# Patient Record
Sex: MALE | Race: WHITE | NOT HISPANIC OR LATINO | Employment: FULL TIME | URBAN - METROPOLITAN AREA
[De-identification: names, ages, dates, MRNs, and addresses within clinical notes are randomized per-mention and may not be internally consistent; named-entity substitution may affect disease eponyms.]

---

## 2023-08-07 ENCOUNTER — HOSPITAL ENCOUNTER (EMERGENCY)
Facility: CLINIC | Age: 28
Discharge: HOME | End: 2023-08-07
Attending: EMERGENCY MEDICINE | Admitting: EMERGENCY MEDICINE
Payer: COMMERCIAL

## 2023-08-07 ENCOUNTER — APPOINTMENT (OUTPATIENT)
Dept: CT IMAGING | Facility: CLINIC | Age: 28
End: 2023-08-07
Attending: EMERGENCY MEDICINE
Payer: COMMERCIAL

## 2023-08-07 VITALS
SYSTOLIC BLOOD PRESSURE: 157 MMHG | RESPIRATION RATE: 18 BRPM | DIASTOLIC BLOOD PRESSURE: 96 MMHG | OXYGEN SATURATION: 97 % | HEART RATE: 115 BPM | TEMPERATURE: 98 F

## 2023-08-07 DIAGNOSIS — R10.11 RIGHT UPPER QUADRANT ABDOMINAL PAIN: ICD-10-CM

## 2023-08-07 LAB
ALBUMIN SERPL BCG-MCNC: 5 G/DL (ref 3.5–5.2)
ALBUMIN UR-MCNC: 50 MG/DL
ALP SERPL-CCNC: 68 U/L (ref 40–129)
ALT SERPL W P-5'-P-CCNC: 81 U/L (ref 0–70)
ANION GAP SERPL CALCULATED.3IONS-SCNC: 15 MMOL/L (ref 7–15)
APPEARANCE UR: CLEAR
AST SERPL W P-5'-P-CCNC: 78 U/L (ref 0–45)
BASOPHILS # BLD AUTO: 0.1 10E3/UL (ref 0–0.2)
BASOPHILS NFR BLD AUTO: 1 %
BILIRUB SERPL-MCNC: 0.7 MG/DL
BILIRUB UR QL STRIP: NEGATIVE
BUN SERPL-MCNC: 8.1 MG/DL (ref 6–20)
CALCIUM SERPL-MCNC: 9.3 MG/DL (ref 8.6–10)
CHLORIDE SERPL-SCNC: 100 MMOL/L (ref 98–107)
COLOR UR AUTO: YELLOW
CREAT SERPL-MCNC: 1.09 MG/DL (ref 0.67–1.17)
DEPRECATED HCO3 PLAS-SCNC: 25 MMOL/L (ref 22–29)
EOSINOPHIL # BLD AUTO: 0.2 10E3/UL (ref 0–0.7)
EOSINOPHIL NFR BLD AUTO: 2 %
ERYTHROCYTE [DISTWIDTH] IN BLOOD BY AUTOMATED COUNT: 13 % (ref 10–15)
GFR SERPL CREATININE-BSD FRML MDRD: >90 ML/MIN/1.73M2
GLUCOSE SERPL-MCNC: 94 MG/DL (ref 70–99)
GLUCOSE UR STRIP-MCNC: NEGATIVE MG/DL
HCT VFR BLD AUTO: 44.3 % (ref 40–53)
HGB BLD-MCNC: 15 G/DL (ref 13.3–17.7)
HGB UR QL STRIP: NEGATIVE
HYALINE CASTS: 1 /LPF
IMM GRANULOCYTES # BLD: 0 10E3/UL
IMM GRANULOCYTES NFR BLD: 0 %
KETONES UR STRIP-MCNC: NEGATIVE MG/DL
LEUKOCYTE ESTERASE UR QL STRIP: NEGATIVE
LIPASE SERPL-CCNC: 13 U/L (ref 13–60)
LYMPHOCYTES # BLD AUTO: 1.2 10E3/UL (ref 0.8–5.3)
LYMPHOCYTES NFR BLD AUTO: 14 %
MCH RBC QN AUTO: 29 PG (ref 26.5–33)
MCHC RBC AUTO-ENTMCNC: 33.9 G/DL (ref 31.5–36.5)
MCV RBC AUTO: 86 FL (ref 78–100)
MONOCYTES # BLD AUTO: 0.5 10E3/UL (ref 0–1.3)
MONOCYTES NFR BLD AUTO: 6 %
MUCOUS THREADS #/AREA URNS LPF: PRESENT /LPF
NEUTROPHILS # BLD AUTO: 6.4 10E3/UL (ref 1.6–8.3)
NEUTROPHILS NFR BLD AUTO: 77 %
NITRATE UR QL: NEGATIVE
NRBC # BLD AUTO: 0 10E3/UL
NRBC BLD AUTO-RTO: 0 /100
PH UR STRIP: 7 [PH] (ref 5–7)
PLATELET # BLD AUTO: 229 10E3/UL (ref 150–450)
POTASSIUM SERPL-SCNC: 3.8 MMOL/L (ref 3.4–5.3)
PROT SERPL-MCNC: 7.4 G/DL (ref 6.4–8.3)
RBC # BLD AUTO: 5.17 10E6/UL (ref 4.4–5.9)
RBC URINE: <1 /HPF
SODIUM SERPL-SCNC: 140 MMOL/L (ref 136–145)
SP GR UR STRIP: 1.03 (ref 1–1.03)
SQUAMOUS EPITHELIAL: <1 /HPF
UROBILINOGEN UR STRIP-MCNC: NORMAL MG/DL
WBC # BLD AUTO: 8.4 10E3/UL (ref 4–11)
WBC URINE: <1 /HPF

## 2023-08-07 PROCEDURE — 99285 EMERGENCY DEPT VISIT HI MDM: CPT | Mod: 25

## 2023-08-07 PROCEDURE — 85025 COMPLETE CBC W/AUTO DIFF WBC: CPT | Performed by: EMERGENCY MEDICINE

## 2023-08-07 PROCEDURE — 82310 ASSAY OF CALCIUM: CPT | Performed by: EMERGENCY MEDICINE

## 2023-08-07 PROCEDURE — 36415 COLL VENOUS BLD VENIPUNCTURE: CPT | Performed by: EMERGENCY MEDICINE

## 2023-08-07 PROCEDURE — 83690 ASSAY OF LIPASE: CPT | Performed by: EMERGENCY MEDICINE

## 2023-08-07 PROCEDURE — 96374 THER/PROPH/DIAG INJ IV PUSH: CPT | Mod: 59

## 2023-08-07 PROCEDURE — 81001 URINALYSIS AUTO W/SCOPE: CPT | Performed by: EMERGENCY MEDICINE

## 2023-08-07 PROCEDURE — 96361 HYDRATE IV INFUSION ADD-ON: CPT

## 2023-08-07 PROCEDURE — 74177 CT ABD & PELVIS W/CONTRAST: CPT

## 2023-08-07 PROCEDURE — 250N000011 HC RX IP 250 OP 636: Performed by: EMERGENCY MEDICINE

## 2023-08-07 PROCEDURE — 258N000003 HC RX IP 258 OP 636: Performed by: EMERGENCY MEDICINE

## 2023-08-07 RX ORDER — IOPAMIDOL 755 MG/ML
500 INJECTION, SOLUTION INTRAVASCULAR ONCE
Status: COMPLETED | OUTPATIENT
Start: 2023-08-07 | End: 2023-08-07

## 2023-08-07 RX ORDER — LORAZEPAM 2 MG/ML
0.5 INJECTION INTRAMUSCULAR ONCE
Status: COMPLETED | OUTPATIENT
Start: 2023-08-07 | End: 2023-08-07

## 2023-08-07 RX ADMIN — SODIUM CHLORIDE 1000 ML: 9 INJECTION, SOLUTION INTRAVENOUS at 11:05

## 2023-08-07 RX ADMIN — LORAZEPAM 0.5 MG: 2 INJECTION INTRAMUSCULAR; INTRAVENOUS at 11:06

## 2023-08-07 RX ADMIN — SODIUM CHLORIDE 100 ML: 9 INJECTION, SOLUTION INTRAVENOUS at 11:28

## 2023-08-07 RX ADMIN — IOPAMIDOL 75 ML: 755 INJECTION, SOLUTION INTRAVENOUS at 11:28

## 2023-08-07 ASSESSMENT — ACTIVITIES OF DAILY LIVING (ADL)
ADLS_ACUITY_SCORE: 35
ADLS_ACUITY_SCORE: 35

## 2023-08-07 NOTE — ED TRIAGE NOTES
A&O x4, ABCs intact. Pt presents with hematemesis that started today. Pt states that he has left sided abdominal pain as well that he was seen at urgent care for this last week.

## 2023-08-07 NOTE — LETTER
August 7, 2023      To Whom It May Concern:      Stan Cruz was seen in our Emergency Department today, 08/07/23.  I expect his condition to improve over the next 2 days.  He may return to work/school when improved.    Sincerely,        Rosamaria JACKSON

## 2023-08-07 NOTE — LETTER
08/07/23      To Whom it may concern:    Susanna Cruz was in our Emergency Department today, 08/07/23. with a patient who needed their assistance.  Please excuse them from work/school.      Sincerely,      Rosamaria JACKSON

## 2023-08-07 NOTE — ED PROVIDER NOTES
History     Chief Complaint:  Hematemesis    The history is provided by the patient and a parent.      Stan Cruz is a 28 year old male who presents to the ED with mom for evaluation of hematemesis. Stan endorses left upper quadrant abdominal pain and emesis began last week. Hematemesis began this morning with large softball-sized clots, prompting his ED visit today. He notes 10-15 episodes of emesis already today. He has only passed one bowel movement in the last week, with assistance from medications, and he is not passing gas. He endorses recently feeling hot without measured temperature, cough, rhinitis, and decreased urinary frequency. He received Zofran but it did not alleviate symptoms. No pharyngitis or dysuria.    Stan is also concerned that his health struggles are exacerbating his anxiety and mental health concerns. He endorses thoughts to harm himself. No thoughts to harm others. He has not attempted to hurt himself and does not have a plan. He is receiving medical and mental help today voluntarily. He notes occasional alcohol and marijuana use.    Independent Historian:   Mother - They report that Stan has been hospitalized 5 times for bowel obstructions and bowel ruptures. These problems began after he took a knee to his abdomen in freshman year of high school, prompting emergency surgery to remove 6 ft of intestines.    Review of External Notes:   Reviewed Urgent Care visit from 8/3/23 for SBO and small bowel rupture. I reviewed his abdominal XR from 8/3/23.    Medications:    Pepcid  Wellbutrin  Lexapro    Past Medical History:    JESIKA  SBO  Small bowel rupture    Physical Exam   Patient Vitals for the past 24 hrs:   BP Temp Temp src Pulse Resp SpO2   08/07/23 1430 (!) 157/96 -- -- 115 -- --   08/07/23 0958 (!) 157/105 98  F (36.7  C) Oral 92 18 97 %      Physical Exam  General: Resting on the bed.  Head: No obvious trauma to head.  Ears, Nose, Throat:  External ears normal.  Nose normal.    Eyes:   Conjunctivae clear.    CV: Regular rate and rhythm.  No murmurs.      Respiratory: Effort normal and breath sounds normal.  No wheezing or crackles.   Gastrointestinal: Soft.  No distension. There is mild epigastric and RUQ tenderness.  There is no rigidity, no rebound and no guarding.   Neuro: Alert. Moving all extremities appropriately.  Normal speech.    Skin: Skin is warm and dry.  No rash noted.   Psych: Normal mood and affect. Behavior is normal.  Reports some passive SI but no active SI at this time.  Denies any plan.  Denies any prior attempts.  Insight appears intact.    Emergency Department Course     Imaging:  CT Abdomen Pelvis w Contrast   Final Result   IMPRESSION:       1. No evidence of a bowel obstruction.       2. Right upper quadrant small bowel anastomosis with probable adjacent   collapsed small bowel loops versus very short segment intussusception,   which would be of doubtful clinical significance.      3. Mild hepatic steatosis.      LINDY WHITESIDE MD            SYSTEM ID:  [id number]         Report per radiology    Laboratory:  Labs Ordered and Resulted from Time of ED Arrival to Time of ED Departure   COMPREHENSIVE METABOLIC PANEL - Abnormal       Result Value    Sodium 140      Potassium 3.8      Chloride 100      Carbon Dioxide (CO2) 25      Anion Gap 15      Urea Nitrogen 8.1      Creatinine 1.09      Calcium 9.3      Glucose 94      Alkaline Phosphatase 68      AST 78 (*)     ALT 81 (*)     Protein Total 7.4      Albumin 5.0      Bilirubin Total 0.7      GFR Estimate >90     ROUTINE UA WITH MICROSCOPIC REFLEX TO CULTURE - Abnormal    Color Urine Yellow      Appearance Urine Clear      Glucose Urine Negative      Bilirubin Urine Negative      Ketones Urine Negative      Specific Gravity Urine 1.027      Blood Urine Negative      pH Urine 7.0      Protein Albumin Urine 50 (*)     Urobilinogen Urine Normal      Nitrite Urine Negative      Leukocyte Esterase Urine Negative      Mucus  Urine Present (*)     RBC Urine <1      WBC Urine <1      Squamous Epithelials Urine <1      Hyaline Casts Urine 1     LIPASE - Normal    Lipase 13     CBC WITH PLATELETS AND DIFFERENTIAL    WBC Count 8.4      RBC Count 5.17      Hemoglobin 15.0      Hematocrit 44.3      MCV 86      MCH 29.0      MCHC 33.9      RDW 13.0      Platelet Count 229      % Neutrophils 77      % Lymphocytes 14      % Monocytes 6      % Eosinophils 2      % Basophils 1      % Immature Granulocytes 0      NRBCs per 100 WBC 0      Absolute Neutrophils 6.4      Absolute Lymphocytes 1.2      Absolute Monocytes 0.5      Absolute Eosinophils 0.2      Absolute Basophils 0.1      Absolute Immature Granulocytes 0.0      Absolute NRBCs 0.0       Emergency Department Course & Assessments:    PSS-3      Date and Time Over the past 2 weeks have you felt down, depressed, or hopeless? Over the past 2 weeks have you had thoughts of killing yourself? Have you ever attempted to kill yourself? When did this last happen? User   08/07/23 0957 yes yes no -- TE          C-SSRS (Melbeta)      Date and Time Q1 Wished to be Dead (Past Month) Q2 Suicidal Thoughts (Past Month) Q3 Suicidal Thought Method Q4 Suicidal Intent without Specific Plan Q5 Suicide Intent with Specific Plan Q6 Suicide Behavior (Lifetime) Within the Past 3 Months? RETIRED: Level of Risk per Screen Screening Not Complete User   08/07/23 0957 yes yes no no no no -- -- -- TE                Item Assessment   Suicidal Ideation Suicidal thoughts   Plan none   Intent none   Suicidal or self-harm behaviors none   Risk Factors Previous psychiatric diagnosis and treatments   Protective Factors Identified reasons for living and Supportive social network of family and/or friends       Interventions:  Medications   0.9% sodium chloride BOLUS (1,000 mLs Intravenous $New Bag 8/7/23 1105)   LORazepam (ATIVAN) injection 0.5 mg (0.5 mg Intravenous $Given 8/7/23 1106)   0.9% sodium chloride BOLUS (100 mLs  Intravenous $New Bag 23)   iopamidol (ISOVUE-370) solution 500 mL (75 mLs Intravenous $Given 23)     Assessments:  1040 I obtained history and examined the patient as noted above.  1200 I rechecked the patient and explained findings.  1415 I rechecked the patient. The patient will see his therapist tomorrow and virtually consult with his psychiatrist. He has no active plans and does not currently endorse suicidal ideation. His mother is staying with him at home.    Independent Interpretation (X-rays, CTs, rhythm strip):  None    Consultations/Discussion of Management or Tests:  1210 I spoke with Dr. Lissette MD of surgery regarding the patient's history and presentation in the emergency department today.      Social Determinants of Health affecting care:   None    Disposition:  The patient was discharged to home.     Impression & Plan    CMS Diagnoses: None    Medical Decision Makin-year-old male with history of recurrent SBO and perforation presents to the ER with abdominal pain.  Vitals reassuring.  Broad differential was pursued including not limited to obstruction, blockage, perforation, pancreatitis, hepatitis, cholecystitis, gastritis, UTI, etc.  CBC shows no leukocytosis or anemia.  BMP without acute electrolyte, metabolic or renal dysfunction.  Mild elevation in AST and ALT but normal bilirubin.  Lipase normal.  Not concerning for obstructive biliary process, hepatitis, pancreatitis.  UA unrevealing no signs of infection nor blood to indicate nephrolithiasis.  CT scan shows no acute surgical process.  There was noted to be possible intussusception near anastomosis site.  Discussed results with general surgery, without other associated signs of obstruction was felt that this was unlikely clinically relevant.  Likely transient intussusception.  Patient not having black or tarry stools.  He is having some mild hematemesis but is hemoglobin is stable.  Not on blood thinners, not heavy  drinker, no other high risk features.  His symptoms are managed in the ER.  Able to eat and drink without issues.  I have recommended close follow-up with his normal doctor.  I have given general surgery number in case patient wishes to follow-up regarding his abdominal discomfort.  Patient also mentioned suicidal ideation without plan or intent.  He is not actively suicidal at this time.  He sees a therapist tomorrow.  He also has a psychiatrist and will arrange a telehealth health visit.  His mom is staying with him.  He is able to contract for safety at this time.  I have offered DEC assessment but he declines wishing to follow-up with his therapist instead.  This seems reasonable.  He feels safe going home.  His mom feels safe with this plan.  Patient was discharged.    Critical Care time:  was 0 minutes for this patient excluding procedures.    Diagnosis:    ICD-10-CM    1. Right upper quadrant abdominal pain  R10.11            Discharge Medications:  New Prescriptions    No medications on file       Scribe Disclosure:  MARGE, Skylar Glass, am serving as a scribe at 12:21 PM on 8/7/2023 to document services personally performed by Ofelia Valladares MD based on my observations and the provider's statements to me.   8/7/2023   Ofelia Valladares MD Bennett, Jennifer L, MD  08/07/23 1700